# Patient Record
Sex: MALE | Race: WHITE | URBAN - METROPOLITAN AREA
[De-identification: names, ages, dates, MRNs, and addresses within clinical notes are randomized per-mention and may not be internally consistent; named-entity substitution may affect disease eponyms.]

---

## 2020-09-18 ENCOUNTER — TELEPHONE (OUTPATIENT)
Dept: UROLOGY | Facility: CLINIC | Age: 55
End: 2020-09-18

## 2020-09-18 NOTE — TELEPHONE ENCOUNTER
Talked to patient about procedure and has lots of questions . Harper Sotelo, ANKUSHN Staff Nurse

## 2020-09-18 NOTE — TELEPHONE ENCOUNTER
[facility] Call Center    Phone Message    May a detailed message be left on voicemail: no     Reason for Call: Other: Please call Alexander at: [phone] about the Rezum Proceedure.  He is a little concerned about it.     Action Taken: Message routed to:  [facility] ([facility] Urology    Travel Screening: Not Applicable

## 2020-09-21 NOTE — TELEPHONE ENCOUNTER
MEDICAL RECORDS REQUEST   [facility]  Urology Clinic  [address] [place] [address]  PHONE: [phone]  Fax: [phone]        FUTURE VISIT INFORMATION                                                   DONALDO Conde: 1965 scheduled for future visit at [facility]    APPOINTMENT INFORMATION:    Date: 20 10AM    Provider:  Herve Palma MD    Reason for Visit/Diagnosis: Consult reszum procedure from BPH Sx's    REFERRAL INFORMATION:    Referring provider:  Self    Specialty: N/A    Referring providers clinic:  N/A    Clinic contact number:  N/A    RECORDS REQUESTED FOR VISIT                                                     NOTES  STATUS/DETAILS   OFFICE NOTE from referring provider  no   OFFICE NOTE from other specialist  yes   DISCHARGE SUMMARY from hospital  no   DISCHARGE REPORT from the ER  no   OPERATIVE REPORT  no   MEDICATION LIST  no     PRE-VISIT CHECKLIST      Record collection complete Yes- CSS called to double check for any outside recs -    Appointment appropriately scheduled           (right time/right provider) Yes   MyChart activation If no, please explain: In process    Questionnaire complete If no, please explain: In process      Completed by: Katarzyna Pritchard

## 2020-10-26 ENCOUNTER — DOCUMENTATION ONLY (OUTPATIENT)
Dept: CARE COORDINATION | Facility: CLINIC | Age: 55
End: 2020-10-26

## 2020-11-05 ENCOUNTER — PRE VISIT (OUTPATIENT)
Dept: UROLOGY | Facility: CLINIC | Age: 55
End: 2020-11-05

## 2020-11-05 NOTE — TELEPHONE ENCOUNTER
Visit Type : Consult for Reszum     Records/Orders: in epic     Pt Contacted: no    At Rooming: normal

## 2020-11-18 ENCOUNTER — TELEPHONE (OUTPATIENT)
Dept: UROLOGY | Facility: CLINIC | Age: 55
End: 2020-11-18

## 2020-11-18 NOTE — TELEPHONE ENCOUNTER
Called patient to please call clinic to change appointment to a video appointment      Okay to convert to virtual

## 2020-11-23 ENCOUNTER — OFFICE VISIT (OUTPATIENT)
Dept: UROLOGY | Facility: CLINIC | Age: 55
End: 2020-11-23
Payer: COMMERCIAL

## 2020-11-23 ENCOUNTER — PRE VISIT (OUTPATIENT)
Dept: UROLOGY | Facility: CLINIC | Age: 55
End: 2020-11-23

## 2020-11-23 VITALS — DIASTOLIC BLOOD PRESSURE: 92 MMHG | SYSTOLIC BLOOD PRESSURE: 135 MMHG | HEART RATE: 93 BPM

## 2020-11-23 DIAGNOSIS — Z12.5 SCREENING PSA (PROSTATE SPECIFIC ANTIGEN): Primary | ICD-10-CM

## 2020-11-23 PROCEDURE — 99203 OFFICE O/P NEW LOW 30 MIN: CPT | Performed by: UROLOGY

## 2020-11-23 ASSESSMENT — PAIN SCALES - GENERAL: PAINLEVEL: NO PAIN (0)

## 2020-11-23 NOTE — PROGRESS NOTES
CC:LUTS    HPI:    54 yo male with history of LUTS, most notably post-void gtts.  Stream OK, does not need topush, no urgency.  Can go many many hours between voids.  Had a UTI in 1990s, then started drinking lots of water and none since.  Started with gtts in last several years.  Has tried saw palmetto and super beta prostate.  Does have diarrhea which he treats with fiber.    PMhx: none  PSHx: nose surgery, knee surgery  Meds:none  NKDA  FHx: neg for prostate cancer  SocHx: neg for tobac or EtOH  ROS neg for f/c/s, n/v/wt changes    OBJECTIVE:  No acute distress  Abd soft, NT, no flank tenderness  circ phallus, normal meatus  Testes descended and nontender without masses, no hernias  BETTY: no nodules    PSA  1.79 ng/mL 5/7/15    ASSESSMENT AND PLAN:   Over half of today's 30-minute visit was spent counseling the patient regarding his LUTS. I counseled the patient that he really has minimal LUTS and may try simply pushing on the urethra under the scrotum to help clear the last gtts of urine to avoid the post void gtt.  We attempted to get a PVR and AUA Sxs, but the patient had to leave.  I counseled the patient that we would also suggest a PSA as it has been 5 years and the value was higher than average at that time.

## 2020-11-23 NOTE — PATIENT INSTRUCTIONS
Please get PSA and Pat will call you   Please follow up PRN    It was a pleasure meeting with you today.  Thank you for allowing me and my team the privilege of caring for you today.  YOU are the reason we are here, and I truly hope we provided you with the excellent service you deserve.  Please let us know if there is anything else we can do for you so that we can be sure you are leaving completely satisfied with your care experience.

## 2020-11-23 NOTE — LETTER
11/23/2020       RE: Alexander Plunkett  [id number]  [place] [address]     Dear Colleague,    Thank you for referring your patient, Alexander Plunkett, to the [facility] [place] [facility]. Please see a copy of my visit note below.    CC:LUTS    HPI:    56 yo male with history of LUTS, most notably post-void gtts.  Stream OK, does not need topush, no urgency.  Can go many many hours between voids.  Had a UTI in 1990s, then started drinking lots of water and none since.  Started with gtts in last several years.  Has tried saw palmetto and super beta prostate.  Does have diarrhea which he treats with fiber.    PMhx: none  PSHx: nose surgery, knee surgery  Meds:none  NKDA  FHx: neg for prostate cancer  SocHx: neg for tobac or EtOH  ROS neg for f/c/s, n/v/wt changes    OBJECTIVE:  No acute distress  Abd soft, NT, no flank tenderness  circ phallus, normal meatus  Testes descended and nontender without masses, no hernias  BETTY: no nodules    PSA  1.79 ng/mL 5/7/15    ASSESSMENT AND PLAN:   Over half of today's 30-minute visit was spent counseling the patient regarding his LUTS. I counseled the patient that he really has minimal LUTS and may try simply pushing on the urethra under the scrotum to help clear the last gtts of urine to avoid the post void gtt.  We attempted to get a PVR and AUA Sxs, but the patient had to leave.  I counseled the patient that we would also suggest a PSA as it has been 5 years and the value was higher than average at that time.      Again, thank you for allowing me to participate in the care of your patient.      Sincerely,    Herve Palma MD

## 2020-11-23 NOTE — NURSING NOTE
Chief Complaint   Patient presents with     Consult     Discuss Bernabe       Blood pressure (!) 135/92, pulse 93. There is no height or weight on file to calculate BMI.    There is no problem list on file for this patient.      No Known Allergies    No current outpatient medications on file.       Social History     Tobacco Use     Smoking status: Never Smoker     Smokeless tobacco: Never Used   Substance Use Topics     Alcohol use: None     Drug use: None       Sosa De La Paz CMA  11/23/2020  10:09 AM